# Patient Record
Sex: MALE | Race: ASIAN | Employment: FULL TIME | URBAN - METROPOLITAN AREA
[De-identification: names, ages, dates, MRNs, and addresses within clinical notes are randomized per-mention and may not be internally consistent; named-entity substitution may affect disease eponyms.]

---

## 2019-01-03 ENCOUNTER — TRANSFERRED RECORDS (OUTPATIENT)
Dept: HEALTH INFORMATION MANAGEMENT | Facility: CLINIC | Age: 24
End: 2019-01-03

## 2019-10-28 NOTE — TELEPHONE ENCOUNTER
DIAGNOSIS: KNEE INJURY BILATERAL FALLING   APPOINTMENT DATE: 10/31/2019   NOTES STATUS DETAILS   OFFICE NOTE from referring provider In process REQUEST SENT TO [facility]   OFFICE NOTE from other specialist In process    DISCHARGE SUMMARY from hospital In process    DISCHARGE REPORT from the ER In process    OPERATIVE REPORT In process    MEDICATION LIST In process    MRI In process    CT SCAN In process    XRAYS (IMAGES & REPORTS) In process        Action ROSARIO   Action Taken REQUEST SENT TO [facility] [place] PT SCHEDULE Friday.. CDK

## 2019-10-31 ENCOUNTER — ANCILLARY PROCEDURE (OUTPATIENT)
Dept: GENERAL RADIOLOGY | Facility: CLINIC | Age: 24
End: 2019-10-31
Attending: FAMILY MEDICINE
Payer: COMMERCIAL

## 2019-10-31 ENCOUNTER — PRE VISIT (OUTPATIENT)
Dept: ORTHOPEDICS | Facility: CLINIC | Age: 24
End: 2019-10-31

## 2019-10-31 ENCOUNTER — OFFICE VISIT (OUTPATIENT)
Dept: ORTHOPEDICS | Facility: CLINIC | Age: 24
End: 2019-10-31
Payer: COMMERCIAL

## 2019-10-31 VITALS
SYSTOLIC BLOOD PRESSURE: 133 MMHG | BODY MASS INDEX: 26.43 KG/M2 | WEIGHT: 188.8 LBS | HEIGHT: 71 IN | DIASTOLIC BLOOD PRESSURE: 77 MMHG | HEART RATE: 77 BPM

## 2019-10-31 DIAGNOSIS — M22.2X1 PATELLOFEMORAL ARTHRALGIA OF BOTH KNEES: ICD-10-CM

## 2019-10-31 DIAGNOSIS — M25.561 RIGHT KNEE PAIN, UNSPECIFIED CHRONICITY: Primary | ICD-10-CM

## 2019-10-31 DIAGNOSIS — M22.2X2 PATELLOFEMORAL ARTHRALGIA OF BOTH KNEES: ICD-10-CM

## 2019-10-31 DIAGNOSIS — M25.561 RIGHT KNEE PAIN, UNSPECIFIED CHRONICITY: ICD-10-CM

## 2019-10-31 ASSESSMENT — MIFFLIN-ST. JEOR: SCORE: 1868.52

## 2019-10-31 NOTE — LETTER
"  10/31/2019      RE: Samm PARKER Chi  [address]  [facility] [address]       Sports Medicine Clinic Visit    PCP: No primary care provider on file.    Samm PARKER Chi is a 24 year old male who is seen  as self referral presenting with bilateral knee pain.     Injury: 1 year ago- left knee; someone tried to block a basketball from behind and landed onto patient as patient fell onto left knee. 2-3 weeks ago right knee- medial aspect of knee hit ground during break dance move    Location of Pain: Right medial knee and left anterior knee  Duration of Pain: 1 year and 2-3 weeks   Rating of Pain: 3-4/10 for right knee and 5/10 for left knee.   Pain is better with: Rest and elevation  Pain is worse with: Palpation for left knee. Walking and knee extension for right knee.  Additional Features: Swelling, weakness  Treatment so far consists of: Stopped activities for 1.5 weeks and walking if severe, elevation   Prior History of related problems: Saw [facility] in [place] 1 year ago for left knee- retinaculum tear     /77 (BP Location: Right arm, Patient Position: Sitting, Cuff Size: Adult Regular)   Pulse 77   Ht 1.803 m (5' 11\")   Wt 85.6 kg (188 lb 12.8 oz)   BMI 26.33 kg/m            PMH:  Dermatitis    Active problem list:  There is no problem list on file for this patient.      FH:  No family history on file. neg for inherited bone or jt dsr    SH:  Social History     Socioeconomic History     Marital status: Single     Spouse name: Not on file     Number of children: Not on file     Years of education: Not on file     Highest education level: Not on file   Occupational History     Not on file   Social Needs     Financial resource strain: Not on file     Food insecurity:     Worry: Not on file     Inability: Not on file     Transportation needs:     Medical: Not on file     Non-medical: Not on file   Tobacco Use     Smoking status: Never Smoker     Smokeless tobacco: Never Used   Substance " and Sexual Activity     Alcohol use: Not on file     Drug use: Not on file     Sexual activity: Not on file   Lifestyle     Physical activity:     Days per week: Not on file     Minutes per session: Not on file     Stress: Not on file   Relationships     Social connections:     Talks on phone: Not on file     Gets together: Not on file     Attends Gnosticism service: Not on file     Active member of club or organization: Not on file     Attends meetings of clubs or organizations: Not on file     Relationship status: Not on file     Intimate partner violence:     Fear of current or ex partner: Not on file     Emotionally abused: Not on file     Physically abused: Not on file     Forced sexual activity: Not on file   Other Topics Concern     Not on file   Social History Narrative     Not on file       MEDS:  See EMR, reviewed  ALL:  See EMR, reviewed    REVIEW OF SYSTEMS:  CONSTITUTIONAL:NEGATIVE for fever, chills, change in weight  INTEGUMENTARY/SKIN: NEGATIVE for worrisome rashes, moles or lesions  EYES: NEGATIVE for vision changes or irritation  ENT/MOUTH: NEGATIVE for ear, mouth and throat problems  RESP:NEGATIVE for significant cough or SOB  BREAST: NEGATIVE for masses, tenderness or discharge  CV: NEGATIVE for chest pain, palpitations or peripheral edema  GI: NEGATIVE for nausea, abdominal pain, heartburn, or change in bowel habits  :NEGATIVE for frequency, dysuria, or hematuria  :NEGATIVE for frequency, dysuria, or hematuria  NEURO: NEGATIVE for weakness, dizziness or paresthesias  ENDOCRINE: NEGATIVE for temperature intolerance, skin/hair changes  HEME/ALLERGY/IMMUNE: NEGATIVE for bleeding problems  PSYCHIATRIC: NEGATIVE for changes in mood or affect        Subjective: This 24-year-old male within the last 2 weeks was doing a particular move in breakdancing where he was pushing down onto his upper extremities and his knees were kicking up behind him.  His right knee was in a valgus position and his kneecap  landed on the floor with the impact along the anterior medial part of the right kneecap.  Since then he has pain that is localized to the anterior lateral part of the right kneecap.  He had a similar injury in 2018 and was seen in [place] involving his left knee..  At that time he had also forcibly struck his patella associated with sports.  He had an MRI of the left knee at that time.  The MRI report is available and suggested bone contusion in the area of the kneecap but not associated with internal derangement.  It did not suggest patellar subluxation or dislocation and he did not have signs of meniscal tears or ligament tears inside the knee.  He still will note some left-sided anterior knee discomfort with his athletic activities.    Objective the bilateral knees reveal no effusion.  He has a symmetrical amount of medial lateral patellar excursion to both his knees.  There is no tenderness about the medial or lateral patella facet of either knee.  Nontender over the medial lateral joint line of either knee.  I can flex and extend both knees fully.  There is no effusion in either knee.  Anterior posterior drawer is negative bilaterally.  MCL and LCL stresses are negative bilaterally.  Lachman's is negative on the right.  Normal range of motion of the bilateral hips.  Nontender over the patellar tendon or distal IT band of either knee.  He can do a full squat.  He has improvements that can be made in 1 and 2 legged squat form and improvements in abdominal bridging strength.  Skin overlying the right knee is normal.  There is no effusion involving the right knee.    An x-ray of the right knee shows no bony abnormality or degenerative change    Assessment patellofemoral discomfort bilateral knees    Plan: I explained the importance of a maintenance program that focuses on pelvi- femoral alignment.  He understands these exercises can be taught in for 6 visits from a physical therapist and their exercises that can be  done at home for maintenance.  I do not see an indication for an MRI of his right knee in the setting of this recent injury.  But he understands that it is working on the physical therapy exercises and not seeing improvement but instead having mechanical symptoms of locking catching or repetitive swelling that an MRI could be considered.s he had no further questions and will follow-up if not improved with physical therapy.      This note was created with the use of Dragon software and unintentional spelling or errors may have occurred.                    Sridhar Velez MD

## 2019-10-31 NOTE — LETTER
Date:November 1, 2019      Patient was self referred, no letter generated. Do not send.        [facility] Health Information

## 2019-10-31 NOTE — PROGRESS NOTES
"Sports Medicine Clinic Visit    PCP: No primary care provider on file.    Samm PARKER Chi is a 24 year old male who is seen  as self referral presenting with bilateral knee pain.     Injury: 1 year ago- left knee; someone tried to block a basketball from behind and landed onto patient as patient fell onto left knee. 2-3 weeks ago right knee- medial aspect of knee hit ground during break dance move    Location of Pain: Right medial knee and left anterior knee  Duration of Pain: 1 year and 2-3 weeks   Rating of Pain: 3-4/10 for right knee and 5/10 for left knee.   Pain is better with: Rest and elevation  Pain is worse with: Palpation for left knee. Walking and knee extension for right knee.  Additional Features: Swelling, weakness  Treatment so far consists of: Stopped activities for 1.5 weeks and walking if severe, elevation   Prior History of related problems: Saw [facility] in [place] 1 year ago for left knee- retinaculum tear     /77 (BP Location: Right arm, Patient Position: Sitting, Cuff Size: Adult Regular)   Pulse 77   Ht 1.803 m (5' 11\")   Wt 85.6 kg (188 lb 12.8 oz)   BMI 26.33 kg/m           PMH:  Dermatitis    Active problem list:  There is no problem list on file for this patient.      FH:  No family history on file. neg for inherited bone or jt dsr    SH:  Social History     Socioeconomic History     Marital status: Single     Spouse name: Not on file     Number of children: Not on file     Years of education: Not on file     Highest education level: Not on file   Occupational History     Not on file   Social Needs     Financial resource strain: Not on file     Food insecurity:     Worry: Not on file     Inability: Not on file     Transportation needs:     Medical: Not on file     Non-medical: Not on file   Tobacco Use     Smoking status: Never Smoker     Smokeless tobacco: Never Used   Substance and Sexual Activity     Alcohol use: Not on file     Drug use: Not on file     Sexual " activity: Not on file   Lifestyle     Physical activity:     Days per week: Not on file     Minutes per session: Not on file     Stress: Not on file   Relationships     Social connections:     Talks on phone: Not on file     Gets together: Not on file     Attends Sabianist service: Not on file     Active member of club or organization: Not on file     Attends meetings of clubs or organizations: Not on file     Relationship status: Not on file     Intimate partner violence:     Fear of current or ex partner: Not on file     Emotionally abused: Not on file     Physically abused: Not on file     Forced sexual activity: Not on file   Other Topics Concern     Not on file   Social History Narrative     Not on file       MEDS:  See EMR, reviewed  ALL:  See EMR, reviewed    REVIEW OF SYSTEMS:  CONSTITUTIONAL:NEGATIVE for fever, chills, change in weight  INTEGUMENTARY/SKIN: NEGATIVE for worrisome rashes, moles or lesions  EYES: NEGATIVE for vision changes or irritation  ENT/MOUTH: NEGATIVE for ear, mouth and throat problems  RESP:NEGATIVE for significant cough or SOB  BREAST: NEGATIVE for masses, tenderness or discharge  CV: NEGATIVE for chest pain, palpitations or peripheral edema  GI: NEGATIVE for nausea, abdominal pain, heartburn, or change in bowel habits  :NEGATIVE for frequency, dysuria, or hematuria  :NEGATIVE for frequency, dysuria, or hematuria  NEURO: NEGATIVE for weakness, dizziness or paresthesias  ENDOCRINE: NEGATIVE for temperature intolerance, skin/hair changes  HEME/ALLERGY/IMMUNE: NEGATIVE for bleeding problems  PSYCHIATRIC: NEGATIVE for changes in mood or affect        Subjective: This 24-year-old male within the last 2 weeks was doing a particular move in breakdancing where he was pushing down onto his upper extremities and his knees were kicking up behind him.  His right knee was in a valgus position and his kneecap landed on the floor with the impact along the anterior medial part of the right  kneecap.  Since then he has pain that is localized to the anterior lateral part of the right kneecap.  He had a similar injury in 2018 and was seen in [place] involving his left knee..  At that time he had also forcibly struck his patella associated with sports.  He had an MRI of the left knee at that time.  The MRI report is available and suggested bone contusion in the area of the kneecap but not associated with internal derangement.  It did not suggest patellar subluxation or dislocation and he did not have signs of meniscal tears or ligament tears inside the knee.  He still will note some left-sided anterior knee discomfort with his athletic activities.    Objective the bilateral knees reveal no effusion.  He has a symmetrical amount of medial lateral patellar excursion to both his knees.  There is no tenderness about the medial or lateral patella facet of either knee.  Nontender over the medial lateral joint line of either knee.  I can flex and extend both knees fully.  There is no effusion in either knee.  Anterior posterior drawer is negative bilaterally.  MCL and LCL stresses are negative bilaterally.  Lachman's is negative on the right.  Normal range of motion of the bilateral hips.  Nontender over the patellar tendon or distal IT band of either knee.  He can do a full squat.  He has improvements that can be made in 1 and 2 legged squat form and improvements in abdominal bridging strength.  Skin overlying the right knee is normal.  There is no effusion involving the right knee.    An x-ray of the right knee shows no bony abnormality or degenerative change    Assessment patellofemoral discomfort bilateral knees    Plan: I explained the importance of a maintenance program that focuses on pelvi- femoral alignment.  He understands these exercises can be taught in for 6 visits from a physical therapist and their exercises that can be done at home for maintenance.  I do not see an indication for an MRI of his  right knee in the setting of this recent injury.  But he understands that it is working on the physical therapy exercises and not seeing improvement but instead having mechanical symptoms of locking catching or repetitive swelling that an MRI could be considered.s he had no further questions and will follow-up if not improved with physical therapy.      This note was created with the use of Dragon software and unintentional spelling or errors may have occurred.